# Patient Record
Sex: FEMALE | Race: ASIAN | NOT HISPANIC OR LATINO | URBAN - METROPOLITAN AREA
[De-identification: names, ages, dates, MRNs, and addresses within clinical notes are randomized per-mention and may not be internally consistent; named-entity substitution may affect disease eponyms.]

---

## 2018-08-15 VITALS
TEMPERATURE: 98 F | OXYGEN SATURATION: 100 % | DIASTOLIC BLOOD PRESSURE: 54 MMHG | HEART RATE: 59 BPM | SYSTOLIC BLOOD PRESSURE: 100 MMHG | RESPIRATION RATE: 18 BRPM

## 2018-08-15 NOTE — H&P ADULT - RS GEN PE MLT RESP DETAILS PC
Patient to ED via EMS for eval of cp. Found pale diaphoretic. C/o tightness all over. stemi called from field. respirations non-labored/airway patent/breath sounds equal/normal/clear to auscultation bilaterally/good air movement

## 2018-08-15 NOTE — H&P ADULT - ASSESSMENT
62 y.o Female with PMHx of HTN, Hyperlipidemia, Hypothyroidism presents for cardiac catheterization with possible intervention if clinically indicated duet o patient's risk factors and persistent CCS Angina Class III Symptoms.       Risks & benefits of procedure and alternative therapy have been explained to the patient including but not limited to: allergic reaction, bleeding w/possible need for blood transfusion, infection, renal and vascular compromise, limb damage, arrhythmia, stroke, vessel dissection/perforation, Myocardial infarction, emergent CABG. Informed consent obtained and in chart. 62 y.o Female with PMHx of HTN, Hyperlipidemia, Hypothyroidism presents for cardiac catheterization with possible intervention if clinically indicated duet o patient's risk factors and persistent CCS Angina Class III Symptoms.     -ASA/Plavix load ordered. CBC stable, Istat Chemistry stable (per Dr. Mckeon to procedure without sunrise values).         Risks & benefits of procedure and alternative therapy have been explained to the patient including but not limited to: allergic reaction, bleeding w/possible need for blood transfusion, infection, renal and vascular compromise, limb damage, arrhythmia, stroke, vessel dissection/perforation, Myocardial infarction, emergent CABG. Informed consent obtained and in chart.

## 2018-08-15 NOTE — H&P ADULT - ADDITIONAL PE
ASA: II  Mallampati: II  12 Lead EKG: Sinus Bradycardia @ 59 BPM, low voltage, Incomplete RBBB, ,non specific T wave chages.

## 2018-08-15 NOTE — H&P ADULT - HISTORY OF PRESENT ILLNESS
62 y.o Female with PMHx of HTN, Hyperlipidemia, Hypothyroidism, who presented to her cardiologist Dr. Minaya c/o new onset CP and SOB over the past few months.  Pt           Denies CP,  SOB, palpitations, dizziness, syncope, recent PND/orthopnea, LE edema, or decrease in exercise tolerance.        In light of pt's risk factors, above CCS Anginal Class _____ symptoms, and an abnormal Stress test, pt is now referred to [facility] for recommended Cardiac Cath with possible intervention if clinically indicated to  r/o suspected underlying CAD. ***SKELETON:      62 y.o Female with PMHx of HTN, Hyperlipidemia, Hypothyroidism, who presented to her cardiologist Dr. Minaya c/o new onset CP and SOB over the past few months.  Pt     Denies palpitations, dizziness, syncope, recent PND/orthopnea, LE edema, or decrease in exercise tolerance.  Per MD note, Pt did undergo a Nuclear Stress test which did not show any ischemia.        In light of pt's risk factors, above CCS Anginal Class _____ symptoms, and an abnormal Stress test, pt is now referred to [facility] for recommended Cardiac Cath with possible intervention if clinically indicated to  r/o suspected underlying CAD. ***SKELETON:      62 y.o Female with PMHx of HTN, Hyperlipidemia, Hypothyroidism, who presented to her cardiologist Dr. Minaya c/o new onset CP and SOB over the past few months.  Pt states she has been experiencing     Denies palpitations, dizziness, syncope, recent PND/orthopnea, LE edema, or decrease in exercise tolerance.  Per MD note, Pt did undergo a Nuclear Stress test which did not show any ischemia.        In light of pt's risk factors, above CCS Anginal Class _____ symptoms, and an abnormal Stress test, pt is now referred to [facility] for recommended Cardiac Cath with possible intervention if clinically indicated to  r/o suspected underlying CAD. ***HISTORY OBTAINED FROM PATIENT'S  - RELIABLE HISTORIAN     **** PT TO BRING IN ALL MEDS     62 y.o Female with PMHx of HTN, Hyperlipidemia, Hypothyroidism, who presented to her cardiologist Dr. Minaya c/o new onset CP and SOB over the past few months.  Pt states she has been experiencing non radiating mid sternal chest "pressure", 6/10 in severity, with accompanying SOB when climbing a flight of stairs often causing her to stop midway to rest before continuing.  Denies palpitations, dizziness, syncope, recent PND/orthopnea, or LE edema.  Per MD note, Pt did undergo a Nuclear Stress test which did not show any ischemia.      In light of pt's risk factors, above CCS Anginal Class 3 symptoms, despite a normal Stress test, pt is now referred to [facility] for recommended Cardiac Cath with possible intervention if clinically indicated to  r/o suspected underlying CAD. ***HISTORY OBTAINED FROM PATIENT'S  - RELIABLE HISTORIAN     62 y.o Female with PMHx of HTN, Hyperlipidemia, Hypothyroidism, who presented to her cardiologist Dr. Minaya c/o new onset CP and SOB over the past few months.  Pt states she has been experiencing non radiating mid sternal chest "pressure", 6/10 in severity, with accompanying SOB when climbing a flight of stairs often causing her to stop midway to rest before continuing.  Denies palpitations, dizziness, syncope, recent PND/orthopnea, or LE edema.  Per MD note, Pt did undergo a Nuclear Stress test which did not show any ischemia.  In light of pt's risk factors, above CCS Anginal Class 3 symptoms, despite a normal Stress test, pt is now referred to [facility] for recommended Cardiac Cath with possible intervention if clinically indicated to  r/o suspected underlying CAD.

## 2018-08-16 ENCOUNTER — INPATIENT (INPATIENT)
Facility: HOSPITAL | Age: 63
LOS: 0 days | Discharge: HOME | DRG: 247 | End: 2018-08-17
Attending: INTERNAL MEDICINE | Admitting: INTERNAL MEDICINE
Payer: COMMERCIAL

## 2018-08-16 LAB
ALBUMIN SERPL ELPH-MCNC: 4.7 G/DL — SIGNIFICANT CHANGE UP (ref 3.3–5)
ALP SERPL-CCNC: 79 U/L — SIGNIFICANT CHANGE UP (ref 40–120)
ALT FLD-CCNC: 57 U/L — HIGH (ref 10–45)
ANION GAP SERPL CALC-SCNC: 13 MMOL/L — SIGNIFICANT CHANGE UP (ref 5–17)
APTT BLD: 29.8 SEC — SIGNIFICANT CHANGE UP (ref 27.5–37.4)
AST SERPL-CCNC: 42 U/L — HIGH (ref 10–40)
BASOPHILS NFR BLD AUTO: 0.4 % — SIGNIFICANT CHANGE UP (ref 0–2)
BILIRUB SERPL-MCNC: 1.2 MG/DL — SIGNIFICANT CHANGE UP (ref 0.2–1.2)
BUN SERPL-MCNC: 17 MG/DL — SIGNIFICANT CHANGE UP (ref 7–23)
CALCIUM SERPL-MCNC: 9.7 MG/DL — SIGNIFICANT CHANGE UP (ref 8.4–10.5)
CHLORIDE SERPL-SCNC: 102 MMOL/L — SIGNIFICANT CHANGE UP (ref 96–108)
CHOLEST SERPL-MCNC: 194 MG/DL — SIGNIFICANT CHANGE UP (ref 10–199)
CK MB CFR SERPL CALC: 3.9 NG/ML — SIGNIFICANT CHANGE UP (ref 0–6.7)
CK SERPL-CCNC: 190 U/L — HIGH (ref 25–170)
CO2 SERPL-SCNC: 26 MMOL/L — SIGNIFICANT CHANGE UP (ref 22–31)
CREAT SERPL-MCNC: 0.9 MG/DL — SIGNIFICANT CHANGE UP (ref 0.5–1.3)
CRP SERPL-MCNC: 0.08 MG/DL — SIGNIFICANT CHANGE UP (ref 0–0.4)
EOSINOPHIL NFR BLD AUTO: 2.1 % — SIGNIFICANT CHANGE UP (ref 0–6)
GLUCOSE SERPL-MCNC: 121 MG/DL — HIGH (ref 70–99)
HBA1C BLD-MCNC: 6 % — HIGH (ref 4–5.6)
HCT VFR BLD CALC: 41.4 % — SIGNIFICANT CHANGE UP (ref 34.5–45)
HDLC SERPL-MCNC: 47 MG/DL — LOW
HGB BLD-MCNC: 13.9 G/DL — SIGNIFICANT CHANGE UP (ref 11.5–15.5)
INR BLD: 1.05 — SIGNIFICANT CHANGE UP (ref 0.88–1.16)
LIPID PNL WITH DIRECT LDL SERPL: 111 MG/DL — SIGNIFICANT CHANGE UP
LYMPHOCYTES # BLD AUTO: 51 % — HIGH (ref 13–44)
MCHC RBC-ENTMCNC: 31.4 PG — SIGNIFICANT CHANGE UP (ref 27–34)
MCHC RBC-ENTMCNC: 33.6 G/DL — SIGNIFICANT CHANGE UP (ref 32–36)
MCV RBC AUTO: 93.5 FL — SIGNIFICANT CHANGE UP (ref 80–100)
MONOCYTES NFR BLD AUTO: 8.7 % — SIGNIFICANT CHANGE UP (ref 2–14)
NEUTROPHILS NFR BLD AUTO: 37.8 % — LOW (ref 43–77)
PLATELET # BLD AUTO: 192 K/UL — SIGNIFICANT CHANGE UP (ref 150–400)
POTASSIUM SERPL-MCNC: 4.3 MMOL/L — SIGNIFICANT CHANGE UP (ref 3.5–5.3)
POTASSIUM SERPL-SCNC: 4.3 MMOL/L — SIGNIFICANT CHANGE UP (ref 3.5–5.3)
PROT SERPL-MCNC: 7.6 G/DL — SIGNIFICANT CHANGE UP (ref 6–8.3)
PROTHROM AB SERPL-ACNC: 11.7 SEC — SIGNIFICANT CHANGE UP (ref 9.8–12.7)
RBC # BLD: 4.43 M/UL — SIGNIFICANT CHANGE UP (ref 3.8–5.2)
RBC # FLD: 12.9 % — SIGNIFICANT CHANGE UP (ref 10.3–16.9)
SODIUM SERPL-SCNC: 141 MMOL/L — SIGNIFICANT CHANGE UP (ref 135–145)
TOTAL CHOLESTEROL/HDL RATIO MEASUREMENT: 4.1 RATIO — SIGNIFICANT CHANGE UP (ref 3.3–7.1)
TRIGL SERPL-MCNC: 180 MG/DL — HIGH (ref 10–149)
TSH SERPL-MCNC: 1.15 UIU/ML — SIGNIFICANT CHANGE UP (ref 0.35–4.94)
WBC # BLD: 5.6 K/UL — SIGNIFICANT CHANGE UP (ref 3.8–10.5)
WBC # FLD AUTO: 5.6 K/UL — SIGNIFICANT CHANGE UP (ref 3.8–10.5)

## 2018-08-16 PROCEDURE — 93458 L HRT ARTERY/VENTRICLE ANGIO: CPT | Mod: 26,XU

## 2018-08-16 PROCEDURE — 92928 PRQ TCAT PLMT NTRAC ST 1 LES: CPT | Mod: RC

## 2018-08-16 PROCEDURE — 93010 ELECTROCARDIOGRAM REPORT: CPT

## 2018-08-16 PROCEDURE — 93571 IV DOP VEL&/PRESS C FLO 1ST: CPT | Mod: 26,LD

## 2018-08-16 PROCEDURE — 99223 1ST HOSP IP/OBS HIGH 75: CPT

## 2018-08-16 RX ORDER — LOSARTAN POTASSIUM 100 MG/1
50 TABLET, FILM COATED ORAL DAILY
Qty: 0 | Refills: 0 | Status: DISCONTINUED | OUTPATIENT
Start: 2018-08-16 | End: 2018-08-17

## 2018-08-16 RX ORDER — CLOPIDOGREL BISULFATE 75 MG/1
600 TABLET, FILM COATED ORAL ONCE
Qty: 0 | Refills: 0 | Status: COMPLETED | OUTPATIENT
Start: 2018-08-16 | End: 2018-08-16

## 2018-08-16 RX ORDER — CLOPIDOGREL BISULFATE 75 MG/1
75 TABLET, FILM COATED ORAL DAILY
Qty: 0 | Refills: 0 | Status: DISCONTINUED | OUTPATIENT
Start: 2018-08-17 | End: 2018-08-17

## 2018-08-16 RX ORDER — LEVOTHYROXINE SODIUM 125 MCG
112 TABLET ORAL DAILY
Qty: 0 | Refills: 0 | Status: DISCONTINUED | OUTPATIENT
Start: 2018-08-16 | End: 2018-08-17

## 2018-08-16 RX ORDER — ATORVASTATIN CALCIUM 80 MG/1
40 TABLET, FILM COATED ORAL AT BEDTIME
Qty: 0 | Refills: 0 | Status: DISCONTINUED | OUTPATIENT
Start: 2018-08-16 | End: 2018-08-17

## 2018-08-16 RX ORDER — SODIUM CHLORIDE 9 MG/ML
500 INJECTION INTRAMUSCULAR; INTRAVENOUS; SUBCUTANEOUS
Qty: 0 | Refills: 0 | Status: DISCONTINUED | OUTPATIENT
Start: 2018-08-16 | End: 2018-08-17

## 2018-08-16 RX ORDER — ATENOLOL 25 MG/1
1 TABLET ORAL
Qty: 0 | Refills: 0 | COMMUNITY

## 2018-08-16 RX ORDER — ASPIRIN/CALCIUM CARB/MAGNESIUM 324 MG
325 TABLET ORAL ONCE
Qty: 0 | Refills: 0 | Status: COMPLETED | OUTPATIENT
Start: 2018-08-16 | End: 2018-08-16

## 2018-08-16 RX ORDER — ASPIRIN/CALCIUM CARB/MAGNESIUM 324 MG
1 TABLET ORAL
Qty: 0 | Refills: 0 | COMMUNITY

## 2018-08-16 RX ORDER — METOPROLOL TARTRATE 50 MG
12.5 TABLET ORAL
Qty: 0 | Refills: 0 | Status: DISCONTINUED | OUTPATIENT
Start: 2018-08-16 | End: 2018-08-17

## 2018-08-16 RX ORDER — SODIUM CHLORIDE 9 MG/ML
500 INJECTION INTRAMUSCULAR; INTRAVENOUS; SUBCUTANEOUS
Qty: 0 | Refills: 0 | Status: DISCONTINUED | OUTPATIENT
Start: 2018-08-16 | End: 2018-08-16

## 2018-08-16 RX ORDER — ASPIRIN/CALCIUM CARB/MAGNESIUM 324 MG
81 TABLET ORAL DAILY
Qty: 0 | Refills: 0 | Status: DISCONTINUED | OUTPATIENT
Start: 2018-08-17 | End: 2018-08-17

## 2018-08-16 RX ORDER — CHLORHEXIDINE GLUCONATE 213 G/1000ML
1 SOLUTION TOPICAL ONCE
Qty: 0 | Refills: 0 | Status: DISCONTINUED | OUTPATIENT
Start: 2018-08-16 | End: 2018-08-16

## 2018-08-16 RX ADMIN — SODIUM CHLORIDE 75 MILLILITER(S): 9 INJECTION INTRAMUSCULAR; INTRAVENOUS; SUBCUTANEOUS at 08:09

## 2018-08-16 RX ADMIN — Medication 12.5 MILLIGRAM(S): at 13:50

## 2018-08-16 RX ADMIN — SODIUM CHLORIDE 75 MILLILITER(S): 9 INJECTION INTRAMUSCULAR; INTRAVENOUS; SUBCUTANEOUS at 13:33

## 2018-08-16 RX ADMIN — ATORVASTATIN CALCIUM 40 MILLIGRAM(S): 80 TABLET, FILM COATED ORAL at 21:16

## 2018-08-16 RX ADMIN — Medication 325 MILLIGRAM(S): at 08:09

## 2018-08-16 RX ADMIN — CLOPIDOGREL BISULFATE 600 MILLIGRAM(S): 75 TABLET, FILM COATED ORAL at 08:09

## 2018-08-16 RX ADMIN — Medication 12.5 MILLIGRAM(S): at 21:16

## 2018-08-16 NOTE — CONSULT NOTE ADULT - ASSESSMENT
62F with PMH of HTN, Hyperlipidemia, and Hypothyroidism who presented to her cardiologist Dr. Minaya with c/o new onset CP and SOB over the past few months. Nuclear Stress test did not show any ischemia. Cath today with MANUEL to p/m RCA. Started on Plavix and ASA 81mg daily. EP consulted for episode of atrial fibrillation that occurred during Cath today. HR recorded in procedure log as Afib with HR 140s. Patient states she felt intense palpitations during that time. No strips were available for review.      - Chads2-Vasc Score = 2 (Female)   - Will proceed with event monitor (to be sent to patient from EP Clinic)  - To be seen by Dr. Navarro 62F with PMH of HTN, Hyperlipidemia, and Hypothyroidism who presented to her cardiologist Dr. Minaya with c/o new onset CP and SOB over the past few months. Nuclear Stress test did not show any ischemia. Cath today with CAD and she received MANUEL to p/m RCA. Started on Plavix and ASA 81mg daily. EP consulted for possible episode of atrial fibrillation with RVR that occurred during Cath today. One 3 second strip available in chart for review was more c/w AT than AF but longer strip would be helpful. Patient states she felt intense palpitations during that time. Converted to NSR before arriving to [facility].     - Chads2-Vasc Score = 2 (Female, HTN). Continue ASA, Plavix. No oral AC for now. Will proceed with event monitor to better assess the arrhythmia and her burden Event monitor to be sent to patient from EP Clinic). Patient should follow-up in EP Clinic in 1-2 months. Needs appointment. Case reviewed and patient seen with Dr. Navarro

## 2018-08-16 NOTE — PROGRESS NOTE ADULT - SUBJECTIVE AND OBJECTIVE BOX
Interventional Cardiology Radial band Removal Note    Pt without complaints.  VSS.    Right Radial access site D-Stat Hemoband in place, no hematoma, no bleed  Radial pulse: 2+    Hemostasis achieved with manual release of hemoband.    No Vasovagal reaction.    Meds given: None    Right Radial access site  no hematoma, no bleed  Radial pulse:    A/P:  s/p DStent  -	continue to monitor  -	-OOB as tolerated  -	Post Procedure Instructions given  -                   Call [phone] if any access site issues.

## 2018-08-16 NOTE — CONSULT NOTE ADULT - SUBJECTIVE AND OBJECTIVE BOX
HPI: 61yo Female with PMH of HTN, Hyperlipidemia, and Hypothyroidism who presented to her cardiologist Dr. Minaya with c/o new onset CP and SOB over the past few months. Pt states she has been experiencing non radiating mid sternal chest "pressure", 6/10 in severity, with accompanying SOB when climbing a flight of stairs often causing her to stop midway to rest before continuing.  Denies palpitations, dizziness, syncope, recent PND/ orthopnea, or LE edema.  Per MD note, Pt did undergo a Nuclear Stress test which did not show any ischemia.  In light of pt's risk factors, above CCS Anginal Class 3 symptoms, despite a normal Stress test, pt is now referred to [facility] for recommended Cardiac Cath with possible intervention if clinically indicated to  r/o suspected underlying CAD.       EP consulted for episode of atrial fibrillation during Cath today. HR recorded at 140s during the episode. Patient states she felt intense palpitations during that time. Nursing notes list the rhythm as Afib with a  bpm. No strips were available for review. Patient states she has never experienced palpitations before. She denies syncope, near syncope, dizziness, lightheadedness.     PAST MEDICAL & SURGICAL HISTORY:  Hypothyroidism  Hyperlipidemia  Hypertension  No significant past surgical history      No pertinent family history in first degree relatives      Social History:   Smoking Denies   Drugs Denies   ETOH Denies     pertinent home medications:    Inpatient Medications:   atorvastatin 40 milliGRAM(s) Oral at bedtime  levothyroxine 112 MICROGram(s) Oral daily  losartan 50 milliGRAM(s) Oral daily  metoprolol tartrate 12.5 milliGRAM(s) Oral two times a day  sodium chloride 0.9%. 500 milliLiter(s) IV Continuous <Continuous>      No Known Allergies      ROS:   CONSTITUTIONAL: No fever, weight loss  EYES: Pt denies  RESPIRATORY: Denies cough, wheezing, chills or hemoptysis; No Shortness of Breath  CARDIOVASCULAR: see HPI  GASTROINTESTINAL: Pt denies  NEUROLOGICAL: Pt denies  SKIN: Pt denies   PSYCHIATRIC: Pt denies  HEME/LYMPH: Pt denies    PHYSICAL:  T(C): 36.7 (08-15-18 @ 14:45), Max: 36.7 (08-15-18 @ 14:45)  HR: 70 (08-16-18 @ 12:35) (59 - 70)  BP: 127/78 (08-16-18 @ 12:35) (100/54 - 127/78)  RR: 17 (08-16-18 @ 12:35) (17 - 18)  SpO2: 98% (08-16-18 @ 12:35) (98% - 100%)  Daily Height in cm: 157.48 (16 Aug 2018 12:35)    Daily     Appearance: NAD, pleasant, conversant   Cardiovascular: Normal S1 S2, No JVD, No murmurs, No edema  Respiratory: Lungs clear to auscultation bilaterally.  No wheeze, rhonchi, rales  Gastrointestinal:  Soft, NT/ ND, + BS	  Neurologic: A&O x3, No deficit noted  Extremities: WWP, No edema, pulses intact      LABS:                        13.9   5.6   )-----------( 192      ( 16 Aug 2018 07:37 )             41.4     08-16    141  |  102  |  17  ----------------------------<  121<H>  4.3   |  26  |  0.90    Ca    9.7      16 Aug 2018 07:37    TPro  7.6  /  Alb  4.7  /  TBili  1.2  /  DBili  x   /  AST  42<H>  /  ALT  57<H>  /  AlkPhos  79  08-16    PT/INR - ( 16 Aug 2018 07:37 )   PT: 11.7 sec;   INR: 1.05     PTT - ( 16 Aug 2018 07:37 )  PTT:29.8 sec    TSH    Troponin  CARDIAC MARKERS ( 16 Aug 2018 07:37 )  x     / x     / 190 U/L / x     / 3.9 ng/mL      LIVER FUNCTIONS - ( 16 Aug 2018 07:37 )  Alb: 4.7 g/dL / Pro: 7.6 g/dL / ALK PHOS: 79 U/L / ALT: 57 U/L / AST: 42 U/L / GGT: x             EKG: pre-cath EKG sinus miranda    Telemetry: NSR with HR 60s-90s.     ECHO: None    Prior EP procedures: None     Cath / stress / Cardiac CTa: Cath today 08/16/18 with MANUEL to p/m RCA     Assessment/ Plan: HPI: 61yo Female with PMH of HTN, Hyperlipidemia, and Hypothyroidism who presented to her cardiologist Dr. Minaya with c/o new onset CP and SOB over the past few months. Pt states she has been experiencing non radiating mid sternal chest "pressure", 6/10 in severity, with accompanying SOB when climbing a flight of stairs often causing her to stop midway to rest before continuing.  Denies palpitations, dizziness, syncope, recent PND/ orthopnea, or LE edema.  Per MD note, Pt did undergo a Nuclear Stress test which did not show any ischemia.  In light of pt's risk factors, above CCS Anginal Class 3 symptoms, despite a normal Stress test, pt is now referred to [facility] for recommended Cardiac Cath with possible intervention if clinically indicated to  r/o suspected underlying CAD.       EP consulted for possible episode of atrial fibrillation during Cath today. HR recorded at 140s during the episode. Patient states she felt intense palpitations during that time. Nursing notes list the rhythm as Afib with a  bpm. No strips were available for review. Patient states she has never experienced palpitations before. She denies syncope, near syncope, dizziness, lightheadedness.  Of note, patient self-converted to NSR before arriving to [facility].     PAST MEDICAL & SURGICAL HISTORY:  Hypothyroidism  Hyperlipidemia  Hypertension  No significant past surgical history      No pertinent family history in first degree relatives      Social History:   Smoking Denies   Drugs Denies   ETOH Denies     pertinent home medications:    Inpatient Medications:   atorvastatin 40 milliGRAM(s) Oral at bedtime  levothyroxine 112 MICROGram(s) Oral daily  losartan 50 milliGRAM(s) Oral daily  metoprolol tartrate 12.5 milliGRAM(s) Oral two times a day  sodium chloride 0.9%. 500 milliLiter(s) IV Continuous <Continuous>      No Known Allergies      ROS:   CONSTITUTIONAL: No fever, weight loss  EYES: Pt denies  RESPIRATORY: Denies cough, wheezing, chills or hemoptysis; No Shortness of Breath  CARDIOVASCULAR: see HPI  GASTROINTESTINAL: Pt denies  NEUROLOGICAL: Pt denies  SKIN: Pt denies   PSYCHIATRIC: Pt denies  HEME/LYMPH: Pt denies    PHYSICAL:  T(C): 36.7 (08-15-18 @ 14:45), Max: 36.7 (08-15-18 @ 14:45)  HR: 70 (08-16-18 @ 12:35) (59 - 70)  BP: 127/78 (08-16-18 @ 12:35) (100/54 - 127/78)  RR: 17 (08-16-18 @ 12:35) (17 - 18)  SpO2: 98% (08-16-18 @ 12:35) (98% - 100%)  Daily Height in cm: 157.48 (16 Aug 2018 12:35)    Daily     Appearance: NAD, pleasant, conversant   Cardiovascular: Normal S1 S2, No JVD, No murmurs, No edema  Respiratory: Lungs clear to auscultation bilaterally.  No wheeze, rhonchi, rales  Gastrointestinal:  Soft, NT/ ND, + BS	  Neurologic: A&O x3, No deficit noted  Extremities: WWP, No edema, pulses intact      LABS:                        13.9   5.6   )-----------( 192      ( 16 Aug 2018 07:37 )             41.4     08-16    141  |  102  |  17  ----------------------------<  121<H>  4.3   |  26  |  0.90    Ca    9.7      16 Aug 2018 07:37    TPro  7.6  /  Alb  4.7  /  TBili  1.2  /  DBili  x   /  AST  42<H>  /  ALT  57<H>  /  AlkPhos  79  08-16    PT/INR - ( 16 Aug 2018 07:37 )   PT: 11.7 sec;   INR: 1.05     PTT - ( 16 Aug 2018 07:37 )  PTT:29.8 sec    TSH    Troponin  CARDIAC MARKERS ( 16 Aug 2018 07:37 )  x     / x     / 190 U/L / x     / 3.9 ng/mL      LIVER FUNCTIONS - ( 16 Aug 2018 07:37 )  Alb: 4.7 g/dL / Pro: 7.6 g/dL / ALK PHOS: 79 U/L / ALT: 57 U/L / AST: 42 U/L / GGT: x             EKG: pre-cath EKG sinus miranda    Telemetry: One 3 sec strips from intra-procedure c/w AT. Patient subsequently converted to NSR before arriving to [facility], all tele on [facility] is NSR HR 60s-90s since arrival to floor.     ECHO: None in inpatient or outpatient charts    Prior EP procedures: None     Cath / stress / Cardiac CTa: Cath today 08/16/18 with MANUEL to p/m RCA     Assessment/ Plan:

## 2018-08-17 VITALS — TEMPERATURE: 97 F

## 2018-08-17 LAB
ANION GAP SERPL CALC-SCNC: 10 MMOL/L — SIGNIFICANT CHANGE UP (ref 5–17)
BUN SERPL-MCNC: 21 MG/DL — SIGNIFICANT CHANGE UP (ref 7–23)
CALCIUM SERPL-MCNC: 9.4 MG/DL — SIGNIFICANT CHANGE UP (ref 8.4–10.5)
CHLORIDE SERPL-SCNC: 102 MMOL/L — SIGNIFICANT CHANGE UP (ref 96–108)
CO2 SERPL-SCNC: 26 MMOL/L — SIGNIFICANT CHANGE UP (ref 22–31)
CREAT SERPL-MCNC: 0.92 MG/DL — SIGNIFICANT CHANGE UP (ref 0.5–1.3)
GLUCOSE SERPL-MCNC: 126 MG/DL — HIGH (ref 70–99)
HCT VFR BLD CALC: 39.8 % — SIGNIFICANT CHANGE UP (ref 34.5–45)
HGB BLD-MCNC: 13.6 G/DL — SIGNIFICANT CHANGE UP (ref 11.5–15.5)
MAGNESIUM SERPL-MCNC: 2.2 MG/DL — SIGNIFICANT CHANGE UP (ref 1.6–2.6)
MCHC RBC-ENTMCNC: 31.5 PG — SIGNIFICANT CHANGE UP (ref 27–34)
MCHC RBC-ENTMCNC: 34.2 G/DL — SIGNIFICANT CHANGE UP (ref 32–36)
MCV RBC AUTO: 92.1 FL — SIGNIFICANT CHANGE UP (ref 80–100)
PLATELET # BLD AUTO: 197 K/UL — SIGNIFICANT CHANGE UP (ref 150–400)
POTASSIUM SERPL-MCNC: 4.3 MMOL/L — SIGNIFICANT CHANGE UP (ref 3.5–5.3)
POTASSIUM SERPL-SCNC: 4.3 MMOL/L — SIGNIFICANT CHANGE UP (ref 3.5–5.3)
RBC # BLD: 4.32 M/UL — SIGNIFICANT CHANGE UP (ref 3.8–5.2)
RBC # FLD: 12.8 % — SIGNIFICANT CHANGE UP (ref 10.3–16.9)
SODIUM SERPL-SCNC: 138 MMOL/L — SIGNIFICANT CHANGE UP (ref 135–145)
WBC # BLD: 7.4 K/UL — SIGNIFICANT CHANGE UP (ref 3.8–10.5)
WBC # FLD AUTO: 7.4 K/UL — SIGNIFICANT CHANGE UP (ref 3.8–10.5)

## 2018-08-17 PROCEDURE — 85027 COMPLETE CBC AUTOMATED: CPT

## 2018-08-17 PROCEDURE — 85610 PROTHROMBIN TIME: CPT

## 2018-08-17 PROCEDURE — 80053 COMPREHEN METABOLIC PANEL: CPT

## 2018-08-17 PROCEDURE — 86140 C-REACTIVE PROTEIN: CPT

## 2018-08-17 PROCEDURE — 85730 THROMBOPLASTIN TIME PARTIAL: CPT

## 2018-08-17 PROCEDURE — C1874: CPT

## 2018-08-17 PROCEDURE — 80048 BASIC METABOLIC PNL TOTAL CA: CPT

## 2018-08-17 PROCEDURE — C1887: CPT

## 2018-08-17 PROCEDURE — 82550 ASSAY OF CK (CPK): CPT

## 2018-08-17 PROCEDURE — 93010 ELECTROCARDIOGRAM REPORT: CPT

## 2018-08-17 PROCEDURE — 82553 CREATINE MB FRACTION: CPT

## 2018-08-17 PROCEDURE — 99231 SBSQ HOSP IP/OBS SF/LOW 25: CPT | Mod: 25

## 2018-08-17 PROCEDURE — 93005 ELECTROCARDIOGRAM TRACING: CPT

## 2018-08-17 PROCEDURE — 83036 HEMOGLOBIN GLYCOSYLATED A1C: CPT

## 2018-08-17 PROCEDURE — 36415 COLL VENOUS BLD VENIPUNCTURE: CPT

## 2018-08-17 PROCEDURE — C1894: CPT

## 2018-08-17 PROCEDURE — 85025 COMPLETE CBC W/AUTO DIFF WBC: CPT

## 2018-08-17 PROCEDURE — C1725: CPT

## 2018-08-17 PROCEDURE — C1769: CPT

## 2018-08-17 PROCEDURE — 84443 ASSAY THYROID STIM HORMONE: CPT

## 2018-08-17 PROCEDURE — 83735 ASSAY OF MAGNESIUM: CPT

## 2018-08-17 PROCEDURE — 99239 HOSP IP/OBS DSCHRG MGMT >30: CPT

## 2018-08-17 PROCEDURE — 80061 LIPID PANEL: CPT

## 2018-08-17 RX ORDER — ATORVASTATIN CALCIUM 80 MG/1
1 TABLET, FILM COATED ORAL
Qty: 30 | Refills: 3
Start: 2018-08-17 | End: 2018-12-14

## 2018-08-17 RX ORDER — CLOPIDOGREL BISULFATE 75 MG/1
1 TABLET, FILM COATED ORAL
Qty: 30 | Refills: 11
Start: 2018-08-17 | End: 2019-08-11

## 2018-08-17 RX ORDER — ASPIRIN/CALCIUM CARB/MAGNESIUM 324 MG
1 TABLET ORAL
Qty: 30 | Refills: 11
Start: 2018-08-17 | End: 2019-08-11

## 2018-08-17 RX ORDER — SIMVASTATIN 20 MG/1
1 TABLET, FILM COATED ORAL
Qty: 0 | Refills: 0 | COMMUNITY

## 2018-08-17 RX ORDER — METOPROLOL TARTRATE 50 MG
0.5 TABLET ORAL
Qty: 30 | Refills: 3
Start: 2018-08-17 | End: 2018-12-14

## 2018-08-17 RX ADMIN — LOSARTAN POTASSIUM 50 MILLIGRAM(S): 100 TABLET, FILM COATED ORAL at 05:41

## 2018-08-17 RX ADMIN — CLOPIDOGREL BISULFATE 75 MILLIGRAM(S): 75 TABLET, FILM COATED ORAL at 11:34

## 2018-08-17 RX ADMIN — Medication 81 MILLIGRAM(S): at 11:34

## 2018-08-17 RX ADMIN — Medication 112 MICROGRAM(S): at 05:41

## 2018-08-17 RX ADMIN — Medication 12.5 MILLIGRAM(S): at 11:34

## 2018-08-17 NOTE — DISCHARGE NOTE ADULT - CARE PROVIDER_API CALL
Shaik MAURO Minaya), Cardiology; Nuclear Medicine  [address] [place] [address]  Phone: [phone]  Fax: [phone]    Krishan Navarro), Cardiac Electrophysiology; Cardiovascular Disease  [address] [address]  Phone: [phone]  Fax: [phone]

## 2018-08-17 NOTE — DISCHARGE NOTE ADULT - PATIENT PORTAL LINK FT
You can access the [facility] Patient Portal, offered by [facility], by registering with the following website: [email or web address]

## 2018-08-17 NOTE — DISCHARGE NOTE ADULT - CARE PLAN
Assessment and plan of treatment:	Please avoid any heavy lifting  (no more than 3 to 5 lbs) or strenuous activity for five days  If you develop any swelling, bleeding, hardening of the skin (hematoma formation), acute pain, numbness/tingling  in your arm please contact your doctor immediately or call our 24/7 line: [phone]    NEVER MISS A DOSE OF ASPIRIN OR PLAVIX; IF YOU DO, YOU ARE AT RISK OF YOUR STENT CLOSING AND HAVING A HEART ATTACK. DO NOT STOP THESE TWO MEDICATIONS UNLESS INSTRUCTED TO DO SO BY YOUR CARDIOLOGIST Principal Discharge DX:	Coronary artery disease  Goal:	Please continue aspirin 81 mg oral daily and plavix 75 mg oral daily.  Assessment and plan of treatment:	You underwent a cardiac catheterization and received a stent to the right coronary artery. The procedure was done through the right wrist.   Please avoid any heavy lifting  (no more than 3 to 5 lbs) or strenuous activity for five days  If you develop any swelling, bleeding, hardening of the skin (hematoma formation), acute pain, numbness/tingling  in your arm please contact your doctor immediately or call our 24/7 line: [phone]    NEVER MISS A DOSE OF ASPIRIN OR PLAVIX; IF YOU DO, YOU ARE AT RISK OF YOUR STENT CLOSING AND HAVING A HEART ATTACK. DO NOT STOP THESE TWO MEDICATIONS UNLESS INSTRUCTED TO DO SO BY YOUR CARDIOLOGIST  Secondary Diagnosis:	Hypertension  Secondary Diagnosis:	Hyperlipidemia Principal Discharge DX:	Coronary artery disease  Goal:	Please continue aspirin 81 mg oral daily and plavix 75 mg oral daily.  Assessment and plan of treatment:	You underwent a cardiac catheterization and received a stent to the right coronary artery. The procedure was done through the right wrist.  Please avoid any heavy lifting  (no more than 3 to 5 lbs) or strenuous activity for five days.  If you develop any swelling, bleeding, hardening of the skin (hematoma formation), acute pain, numbness/tingling  in your arm please contact your doctor immediately or call our 24/7 line: [phone]    NEVER MISS A DOSE OF ASPIRIN OR PLAVIX; IF YOU DO, YOU ARE AT RISK OF YOUR STENT CLOSING AND HAVING A HEART ATTACK. DO NOT STOP THESE TWO MEDICATIONS UNLESS INSTRUCTED TO DO SO BY YOUR CARDIOLOGIST    Please follow up with Dr. Minaya in 1-2 weeks  Secondary Diagnosis:	Hypertension  Goal:	Please continue losartan 50 mg oral daily  Secondary Diagnosis:	Hyperlipidemia  Goal:	Please start lipitor 40 mg oral daily  Assessment and plan of treatment:	We switched your simvastatin 20 mg oral daily to lipitor 40 mg oral daily  Secondary Diagnosis:	Atrial tachycardia  Goal:	Please follow up with Dr. Navarro in 1-2 months  Assessment and plan of treatment:	During the cardiac catheterization your heart rate became elevated. Please start metoprolol 25 mg (1/2 Tab) oral twice daily for heart rate control. We consulted the electrophysiology service who gave you a heart monitor to take home. Please follow up in their clinic with Dr. Navarro in 1-2 months. Principal Discharge DX:	Coronary artery disease  Goal:	Please continue aspirin 81 mg oral daily and plavix 75 mg oral daily.  Assessment and plan of treatment:	You underwent a cardiac catheterization and received a stent to the right coronary artery. The procedure was done through the right wrist.  Please avoid any heavy lifting  (no more than 3 to 5 lbs) or strenuous activity for five days.  If you develop any swelling, bleeding, hardening of the skin (hematoma formation), acute pain, numbness/tingling  in your arm please contact your doctor immediately or call our 24/7 line: [phone]    NEVER MISS A DOSE OF ASPIRIN OR PLAVIX; IF YOU DO, YOU ARE AT RISK OF YOUR STENT CLOSING AND HAVING A HEART ATTACK. DO NOT STOP THESE TWO MEDICATIONS UNLESS INSTRUCTED TO DO SO BY YOUR CARDIOLOGIST    Please follow up with Dr. Minaya in 1-2 weeks  Secondary Diagnosis:	Hypertension  Goal:	Please continue losartan 50 mg oral daily  Secondary Diagnosis:	Hyperlipidemia  Goal:	Please start lipitor 40 mg oral daily  Assessment and plan of treatment:	We switched your simvastatin 20 mg oral daily to lipitor 40 mg oral daily  Secondary Diagnosis:	Atrial tachycardia  Goal:	Please follow up with Dr. Navarro in 1-2 months. Please call to make an appointment  Assessment and plan of treatment:	During the cardiac catheterization your heart rate became elevated. Please start metoprolol 25 mg (1/2 Tab) oral twice daily for heart rate control. We consulted the electrophysiology service who will mail you a heart monitor to your home. Please follow up in their clinic with Dr. Navarro in 1-2 months.

## 2018-08-17 NOTE — DISCHARGE NOTE ADULT - PLAN OF CARE
Please avoid any heavy lifting  (no more than 3 to 5 lbs) or strenuous activity for five days  If you develop any swelling, bleeding, hardening of the skin (hematoma formation), acute pain, numbness/tingling  in your arm please contact your doctor immediately or call our 24/7 line: [phone]    NEVER MISS A DOSE OF ASPIRIN OR PLAVIX; IF YOU DO, YOU ARE AT RISK OF YOUR STENT CLOSING AND HAVING A HEART ATTACK. DO NOT STOP THESE TWO MEDICATIONS UNLESS INSTRUCTED TO DO SO BY YOUR CARDIOLOGIST Please continue aspirin 81 mg oral daily and plavix 75 mg oral daily. You underwent a cardiac catheterization and received a stent to the right coronary artery. The procedure was done through the right wrist.   Please avoid any heavy lifting  (no more than 3 to 5 lbs) or strenuous activity for five days  If you develop any swelling, bleeding, hardening of the skin (hematoma formation), acute pain, numbness/tingling  in your arm please contact your doctor immediately or call our 24/7 line: [phone]    NEVER MISS A DOSE OF ASPIRIN OR PLAVIX; IF YOU DO, YOU ARE AT RISK OF YOUR STENT CLOSING AND HAVING A HEART ATTACK. DO NOT STOP THESE TWO MEDICATIONS UNLESS INSTRUCTED TO DO SO BY YOUR CARDIOLOGIST We switched your simvastatin 20 mg oral daily to lipitor 40 mg oral daily Please follow up with Dr. Navarro in 1-2 months During the cardiac catheterization your heart rate became elevated. Please start metoprolol 25 mg (1/2 Tab) oral twice daily for heart rate control. We consulted the electrophysiology service who gave you a heart monitor to take home. Please follow up in their clinic with Dr. Navarro in 1-2 months. You underwent a cardiac catheterization and received a stent to the right coronary artery. The procedure was done through the right wrist.  Please avoid any heavy lifting  (no more than 3 to 5 lbs) or strenuous activity for five days.  If you develop any swelling, bleeding, hardening of the skin (hematoma formation), acute pain, numbness/tingling  in your arm please contact your doctor immediately or call our 24/7 line: [phone]    NEVER MISS A DOSE OF ASPIRIN OR PLAVIX; IF YOU DO, YOU ARE AT RISK OF YOUR STENT CLOSING AND HAVING A HEART ATTACK. DO NOT STOP THESE TWO MEDICATIONS UNLESS INSTRUCTED TO DO SO BY YOUR CARDIOLOGIST    Please follow up with Dr. Minaya in 1-2 weeks Please continue losartan 50 mg oral daily Please start lipitor 40 mg oral daily Please follow up with Dr. Navarro in 1-2 months. Please call to make an appointment During the cardiac catheterization your heart rate became elevated. Please start metoprolol 25 mg (1/2 Tab) oral twice daily for heart rate control. We consulted the electrophysiology service who will mail you a heart monitor to your home. Please follow up in their clinic with Dr. Navarro in 1-2 months.

## 2018-08-17 NOTE — DISCHARGE NOTE ADULT - HOSPITAL COURSE
62 y.o Female with PMHx of HTN, Hyperlipidemia, Hypothyroidism, who presented to her cardiologist Dr. Minaya c/o new onset CP and SOB over the past few months.  Pt states she has been experiencing non radiating mid sternal chest "pressure", 6/10 in severity, with accompanying SOB when climbing a flight of stairs often causing her to stop midway to rest before continuing.  Denies palpitations, dizziness, syncope, recent PND/orthopnea, or LE edema.  Per MD note, Pt did undergo a Nuclear Stress test which did not show any ischemia.  In light of pt's risk factors, above CCS Anginal Class 3 symptoms, despite a normal Stress test, pt is now referred to [facility] for recommended Cardiac Cath with possible intervention if clinically indicated to  r/o suspected underlying CAD. Pt is now s/p cardiac catheterization with PTCA/MANUEL pRCA, ffr 60% LAD lesion, EF normal, radial access.  Intraprocedure pt went into Afib 140's given lopressor 2.5 mg IVx2 doses and cardizem 10 mg x1 with improvement of HR to 90's. Pt converted shortly after arriving in holding area. EP consulted and reviewed strips and telemety and concluded that pt went into atach and no ac needed at this time. Pt to follow up with EP in 1-2 months at clinic and will be sent home with event monitor. Pt started on metoprolol 12.5 mg BID, simvastatin 20 mg switched to atorvastatin 40 mg 62 y.o Female with PMHx of HTN, Hyperlipidemia, Hypothyroidism, who presented to her cardiologist Dr. Minaya c/o new onset CP and SOB over the past few months.  Pt states she has been experiencing non radiating mid sternal chest "pressure", 6/10 in severity, with accompanying SOB when climbing a flight of stairs often causing her to stop midway to rest before continuing.  Denies palpitations, dizziness, syncope, recent PND/orthopnea, or LE edema.  Per MD note, Pt did undergo a Nuclear Stress test which did not show any ischemia.  In light of pt's risk factors, above CCS Anginal Class 3 symptoms, despite a normal Stress test, pt is now referred to [facility] for recommended Cardiac Cath with possible intervention if clinically indicated to  r/o suspected underlying CAD. Pt is now s/p cardiac catheterization with PTCA/MANUEL pRCA, ffr 60% LAD lesion, EF normal, radial access.  Intraprocedure pt went into Afib 140's given lopressor 2.5 mg IVx2 doses and cardizem 10 mg x1 with improvement of HR to 90's. Pt converted shortly after arriving in holding area. EP consulted and reviewed strips and telemety and concluded that pt went into atach and no ac needed at this time. Pt to follow up with EP in 1-2 months at clinic and will be sent home with event monitor. Pt started on metoprolol 12.5 mg BID, simvastatin 20 mg switched to atorvastatin 40 mg. Pt to be discharged with aspirin 81 mg and plavix 75 mg daily. Pt without events overnight, labs checked, VSS. Radial site stable without hematoma, bleeding, distal pulse intact. Pt deemed stable for discharge per Dr. Bahena and will follow up with her cardiologist Dr. Minaya in 1-2 weeks of discharge and EP clinic in 1-2 months.

## 2018-08-17 NOTE — DISCHARGE NOTE ADULT - INSTRUCTIONS
Please avoid any heavy lifting  (no more than 3 to 5 lbs) or strenuous activity for five days  If you develop any swelling, bleeding, hardening of the skin (hematoma formation), acute pain, numbness/tingling  in your arm please contact your doctor immediately or call our 24/7 line: [phone]

## 2018-08-17 NOTE — DISCHARGE NOTE ADULT - CARE PROVIDERS DIRECT ADDRESSES
,[email or web address]

## 2018-08-17 NOTE — DISCHARGE NOTE ADULT - MEDICATION SUMMARY - MEDICATIONS TO TAKE
I will START or STAY ON the medications listed below when I get home from the hospital:    aspirin 81 mg oral delayed release tablet  -- 1 tab(s) by mouth once a day  -- Indication: For Coronary artery disease, helps keep stent open    losartan 50 mg oral tablet  -- 1 tab(s) by mouth once a day  -- Indication: For blood pressure    atorvastatin 40 mg oral tablet  -- 1 tab(s) by mouth once a day (at bedtime)  -- Indication: For Cholesterol     clopidogrel 75 mg oral tablet  -- 1 tab(s) by mouth once a day  -- Indication: For Coronary artery disease, helps keep stent open     Metoprolol Tartrate 25 mg oral tablet  -- 0.5 tab(s) by mouth 2 times a day  -- Indication: For Heart rate     levothyroxine 112 mcg (0.112 mg) oral tablet  -- 1 tab(s) by mouth once a day  -- Indication: For thyroid

## 2018-08-22 DIAGNOSIS — E78.5 HYPERLIPIDEMIA, UNSPECIFIED: ICD-10-CM

## 2018-08-22 DIAGNOSIS — I47.1 SUPRAVENTRICULAR TACHYCARDIA: ICD-10-CM

## 2018-08-22 DIAGNOSIS — I25.10 ATHEROSCLEROTIC HEART DISEASE OF NATIVE CORONARY ARTERY WITHOUT ANGINA PECTORIS: ICD-10-CM

## 2018-08-22 DIAGNOSIS — I10 ESSENTIAL (PRIMARY) HYPERTENSION: ICD-10-CM

## 2018-08-22 DIAGNOSIS — E03.9 HYPOTHYROIDISM, UNSPECIFIED: ICD-10-CM

## 2023-09-19 PROBLEM — E03.9 HYPOTHYROIDISM, UNSPECIFIED: Chronic | Status: ACTIVE | Noted: 2018-08-15

## 2023-09-19 PROBLEM — E78.5 HYPERLIPIDEMIA, UNSPECIFIED: Chronic | Status: ACTIVE | Noted: 2018-08-15

## 2023-09-19 PROBLEM — I10 ESSENTIAL (PRIMARY) HYPERTENSION: Chronic | Status: ACTIVE | Noted: 2018-08-15

## 2023-09-26 ENCOUNTER — OUTPATIENT (OUTPATIENT)
Dept: OUTPATIENT SERVICES | Facility: HOSPITAL | Age: 68
LOS: 1 days | Discharge: HOME | End: 2023-09-26
Payer: COMMERCIAL

## 2023-09-26 DIAGNOSIS — Z90.710 ACQUIRED ABSENCE OF BOTH CERVIX AND UTERUS: Chronic | ICD-10-CM

## 2023-09-26 DIAGNOSIS — R07.9 CHEST PAIN, UNSPECIFIED: ICD-10-CM

## 2023-09-26 DIAGNOSIS — R07.89 OTHER CHEST PAIN: ICD-10-CM

## 2023-09-26 LAB
ANION GAP SERPL CALC-SCNC: 10 MMOL/L — SIGNIFICANT CHANGE UP (ref 7–14)
BUN SERPL-MCNC: 18 MG/DL — SIGNIFICANT CHANGE UP (ref 7–23)
CALCIUM SERPL-MCNC: 9.8 MG/DL — SIGNIFICANT CHANGE UP (ref 8.4–10.5)
CHLORIDE SERPL-SCNC: 103 MMOL/L — SIGNIFICANT CHANGE UP (ref 98–107)
CO2 SERPL-SCNC: 27 MMOL/L — SIGNIFICANT CHANGE UP (ref 22–31)
CREAT SERPL-MCNC: 0.88 MG/DL — SIGNIFICANT CHANGE UP (ref 0.5–1.3)
EGFR: 72 ML/MIN/1.73M2 — SIGNIFICANT CHANGE UP
GLUCOSE SERPL-MCNC: 120 MG/DL — HIGH (ref 70–99)
HCT VFR BLD CALC: 41.7 % — SIGNIFICANT CHANGE UP (ref 34.5–45)
HGB BLD-MCNC: 14.3 G/DL — SIGNIFICANT CHANGE UP (ref 11.5–15.5)
MCHC RBC-ENTMCNC: 32.2 PG — SIGNIFICANT CHANGE UP (ref 27–34)
MCHC RBC-ENTMCNC: 34.3 GM/DL — SIGNIFICANT CHANGE UP (ref 32–36)
MCV RBC AUTO: 93.9 FL — SIGNIFICANT CHANGE UP (ref 80–100)
NRBC # BLD: 0 /100 WBCS — SIGNIFICANT CHANGE UP (ref 0–0)
NRBC # FLD: 0 K/UL — SIGNIFICANT CHANGE UP (ref 0–0)
PLATELET # BLD AUTO: 214 K/UL — SIGNIFICANT CHANGE UP (ref 150–400)
POTASSIUM SERPL-MCNC: 4.3 MMOL/L — SIGNIFICANT CHANGE UP (ref 3.5–5.3)
POTASSIUM SERPL-SCNC: 4.3 MMOL/L — SIGNIFICANT CHANGE UP (ref 3.5–5.3)
RBC # BLD: 4.44 M/UL — SIGNIFICANT CHANGE UP (ref 3.8–5.2)
RBC # FLD: 12.4 % — SIGNIFICANT CHANGE UP (ref 10.3–14.5)
SODIUM SERPL-SCNC: 140 MMOL/L — SIGNIFICANT CHANGE UP (ref 135–145)
WBC # BLD: 5.58 K/UL — SIGNIFICANT CHANGE UP (ref 3.8–10.5)
WBC # FLD AUTO: 5.58 K/UL — SIGNIFICANT CHANGE UP (ref 3.8–10.5)

## 2023-09-26 PROCEDURE — 99152 MOD SED SAME PHYS/QHP 5/>YRS: CPT

## 2023-09-26 PROCEDURE — 93454 CORONARY ARTERY ANGIO S&I: CPT | Mod: 26,59

## 2023-09-26 PROCEDURE — 93010 ELECTROCARDIOGRAM REPORT: CPT | Mod: 76

## 2023-09-26 PROCEDURE — 92928 PRQ TCAT PLMT NTRAC ST 1 LES: CPT | Mod: RC

## 2023-09-26 RX ORDER — LOSARTAN POTASSIUM 100 MG/1
1 TABLET, FILM COATED ORAL
Qty: 0 | Refills: 0 | DISCHARGE

## 2023-09-26 RX ORDER — SODIUM CHLORIDE 9 MG/ML
500 INJECTION INTRAMUSCULAR; INTRAVENOUS; SUBCUTANEOUS
Refills: 0 | Status: DISCONTINUED | OUTPATIENT
Start: 2023-09-26 | End: 2023-10-10

## 2023-09-26 RX ORDER — SODIUM CHLORIDE 9 MG/ML
3 INJECTION INTRAMUSCULAR; INTRAVENOUS; SUBCUTANEOUS EVERY 8 HOURS
Refills: 0 | Status: DISCONTINUED | OUTPATIENT
Start: 2023-09-26 | End: 2023-10-10

## 2023-09-26 RX ORDER — ACETAMINOPHEN 500 MG
650 TABLET ORAL ONCE
Refills: 0 | Status: COMPLETED | OUTPATIENT
Start: 2023-09-26 | End: 2023-09-26

## 2023-09-26 RX ORDER — LEVOTHYROXINE SODIUM 125 MCG
1 TABLET ORAL
Qty: 0 | Refills: 0 | DISCHARGE

## 2023-09-26 RX ORDER — SODIUM CHLORIDE 9 MG/ML
250 INJECTION INTRAMUSCULAR; INTRAVENOUS; SUBCUTANEOUS ONCE
Refills: 0 | Status: DISCONTINUED | OUTPATIENT
Start: 2023-09-26 | End: 2023-10-10

## 2023-09-26 RX ORDER — CLOPIDOGREL BISULFATE 75 MG/1
1 TABLET, FILM COATED ORAL
Qty: 30 | Refills: 2
Start: 2023-09-26

## 2023-09-26 RX ORDER — ASPIRIN/CALCIUM CARB/MAGNESIUM 324 MG
1 TABLET ORAL
Qty: 30 | Refills: 0
Start: 2023-09-26

## 2023-09-26 RX ORDER — ATORVASTATIN CALCIUM 80 MG/1
1 TABLET, FILM COATED ORAL
Refills: 0 | DISCHARGE

## 2023-09-26 RX ORDER — LOSARTAN POTASSIUM 100 MG/1
50 TABLET, FILM COATED ORAL ONCE
Refills: 0 | Status: DISCONTINUED | OUTPATIENT
Start: 2023-09-26 | End: 2023-10-10

## 2023-09-26 RX ORDER — METOPROLOL TARTRATE 50 MG
1 TABLET ORAL
Refills: 0 | DISCHARGE

## 2023-09-26 RX ORDER — ASPIRIN/CALCIUM CARB/MAGNESIUM 324 MG
1 TABLET ORAL
Qty: 30 | Refills: 3
Start: 2023-09-26

## 2023-09-26 RX ADMIN — Medication 650 MILLIGRAM(S): at 13:07

## 2023-09-26 RX ADMIN — SODIUM CHLORIDE 75 MILLILITER(S): 9 INJECTION INTRAMUSCULAR; INTRAVENOUS; SUBCUTANEOUS at 09:23

## 2023-09-26 RX ADMIN — SODIUM CHLORIDE 75 MILLILITER(S): 9 INJECTION INTRAMUSCULAR; INTRAVENOUS; SUBCUTANEOUS at 12:24

## 2023-09-26 NOTE — H&P CARDIOLOGY - HISTORY OF PRESENT ILLNESS
67 y.o. female presents today for elective cardiac catheterization 67 y.o. female presents today for elective cardiac catheterization. The patient c/0 L sided chest pain started a few months ago, "burning like" aggravate with exertion (walking 1 block), doesn't radiate, resolve with rest. She c/o SOB with exertion. The patient denies palpitations, dizziness, presyncope, syncope,  headache, visual disturbances, CVA, PE, DVT, ARI, abdominal pain, N/V/D/C, hematochezia, melena, dysuria, hematuria, fever, chills. The patient was evaluated by a cardiologist, was found to have abnormal stress test, recommended to have cardiac cath. She denies any complaints at present.

## 2023-09-26 NOTE — H&P CARDIOLOGY - REVIEW OF SYSTEMS
The patient denies palpitations, dizziness, presyncope, syncope,  headache, visual disturbances, CVA, PE, DVT, ARI, abdominal pain, N/V/D/C, hematochezia, melena, dysuria, hematuria, fever, chills.

## 2023-09-26 NOTE — H&P CARDIOLOGY - NSICDXPASTMEDICALHX_GEN_ALL_CORE_FT
PAST MEDICAL HISTORY:  Hyperlipidemia     Hypertension     Hypothyroidism      PAST MEDICAL HISTORY:  Borderline diabetes     Hyperlipidemia     Hypertension     Hypothyroidism

## 2023-09-26 NOTE — H&P CARDIOLOGY - NSICDXFAMILYHX_GEN_ALL_CORE_FT
FAMILY HISTORY:  No pertinent family history in first degree relatives     FAMILY HISTORY:  Father  Still living? No  FH: diabetes mellitus, Age at diagnosis: Age Unknown  FH: heart disease, Age at diagnosis: Age Unknown    Mother  Still living? Unknown  FH: diabetes mellitus, Age at diagnosis: Age Unknown  FH: heart disease, Age at diagnosis: Age Unknown

## 2025-05-22 NOTE — PATIENT PROFILE ADULT. - PAIN SCALE PREFERRED, PROFILE
Huddle and Timeout completed together with team. Patient wristband and ANTOINETTE information verified.  Anesthesia safety check completed. Patient was A&Ox3 none